# Patient Record
Sex: MALE | Race: BLACK OR AFRICAN AMERICAN | URBAN - METROPOLITAN AREA
[De-identification: names, ages, dates, MRNs, and addresses within clinical notes are randomized per-mention and may not be internally consistent; named-entity substitution may affect disease eponyms.]

---

## 2017-09-10 ENCOUNTER — APPOINTMENT (OUTPATIENT)
Dept: CT IMAGING | Facility: CLINIC | Age: 52
End: 2017-09-10
Attending: EMERGENCY MEDICINE
Payer: COMMERCIAL

## 2017-09-10 ENCOUNTER — HOSPITAL ENCOUNTER (EMERGENCY)
Facility: CLINIC | Age: 52
Discharge: HOME | End: 2017-09-10
Attending: EMERGENCY MEDICINE | Admitting: EMERGENCY MEDICINE
Payer: COMMERCIAL

## 2017-09-10 VITALS
SYSTOLIC BLOOD PRESSURE: 128 MMHG | TEMPERATURE: 97.7 F | BODY MASS INDEX: 28.77 KG/M2 | HEART RATE: 64 BPM | WEIGHT: 201 LBS | HEIGHT: 70 IN | OXYGEN SATURATION: 100 % | DIASTOLIC BLOOD PRESSURE: 85 MMHG | RESPIRATION RATE: 20 BRPM

## 2017-09-10 DIAGNOSIS — R31.0 GROSS HEMATURIA: ICD-10-CM

## 2017-09-10 LAB
ALBUMIN UR-MCNC: 30 MG/DL
APPEARANCE UR: ABNORMAL
BASOPHILS # BLD AUTO: 0 10E9/L (ref 0–0.2)
BASOPHILS NFR BLD AUTO: 0.4 %
BILIRUB UR QL STRIP: NEGATIVE
COLOR UR AUTO: ABNORMAL
DIFFERENTIAL METHOD BLD: NORMAL
EOSINOPHIL # BLD AUTO: 0.1 10E9/L (ref 0–0.7)
EOSINOPHIL NFR BLD AUTO: 1.5 %
ERYTHROCYTE [DISTWIDTH] IN BLOOD BY AUTOMATED COUNT: 13.8 % (ref 10–15)
GLUCOSE UR STRIP-MCNC: NEGATIVE MG/DL
HCT VFR BLD AUTO: 43.7 % (ref 40–53)
HGB BLD-MCNC: 14.8 G/DL (ref 13.3–17.7)
HGB UR QL STRIP: ABNORMAL
IMM GRANULOCYTES # BLD: 0 10E9/L (ref 0–0.4)
IMM GRANULOCYTES NFR BLD: 0.3 %
KETONES UR STRIP-MCNC: 5 MG/DL
LEUKOCYTE ESTERASE UR QL STRIP: NEGATIVE
LYMPHOCYTES # BLD AUTO: 1.9 10E9/L (ref 0.8–5.3)
LYMPHOCYTES NFR BLD AUTO: 28.2 %
MCH RBC QN AUTO: 32.6 PG (ref 26.5–33)
MCHC RBC AUTO-ENTMCNC: 33.9 G/DL (ref 31.5–36.5)
MCV RBC AUTO: 96 FL (ref 78–100)
MONOCYTES # BLD AUTO: 0.6 10E9/L (ref 0–1.3)
MONOCYTES NFR BLD AUTO: 9 %
MUCOUS THREADS #/AREA URNS LPF: PRESENT /LPF
NEUTROPHILS # BLD AUTO: 4.1 10E9/L (ref 1.6–8.3)
NEUTROPHILS NFR BLD AUTO: 60.6 %
NITRATE UR QL: NEGATIVE
NRBC # BLD AUTO: 0 10*3/UL
NRBC BLD AUTO-RTO: 0 /100
PH UR STRIP: 5.5 PH (ref 5–7)
PLATELET # BLD AUTO: 208 10E9/L (ref 150–450)
RBC # BLD AUTO: 4.54 10E12/L (ref 4.4–5.9)
RBC #/AREA URNS AUTO: >182 /HPF (ref 0–2)
SOURCE: ABNORMAL
SP GR UR STRIP: 1.02 (ref 1–1.03)
UROBILINOGEN UR STRIP-MCNC: NORMAL MG/DL (ref 0–2)
WBC # BLD AUTO: 6.8 10E9/L (ref 4–11)
WBC #/AREA URNS AUTO: 7 /HPF (ref 0–2)

## 2017-09-10 PROCEDURE — 74178 CT ABD&PLV WO CNTR FLWD CNTR: CPT

## 2017-09-10 PROCEDURE — 25000128 H RX IP 250 OP 636: Performed by: EMERGENCY MEDICINE

## 2017-09-10 PROCEDURE — 85025 COMPLETE CBC W/AUTO DIFF WBC: CPT | Performed by: EMERGENCY MEDICINE

## 2017-09-10 PROCEDURE — 99285 EMERGENCY DEPT VISIT HI MDM: CPT | Mod: 25

## 2017-09-10 PROCEDURE — 25000125 ZZHC RX 250: Performed by: EMERGENCY MEDICINE

## 2017-09-10 PROCEDURE — 81001 URINALYSIS AUTO W/SCOPE: CPT | Performed by: EMERGENCY MEDICINE

## 2017-09-10 RX ORDER — IOPAMIDOL 755 MG/ML
100 INJECTION, SOLUTION INTRAVASCULAR ONCE
Status: COMPLETED | OUTPATIENT
Start: 2017-09-10 | End: 2017-09-10

## 2017-09-10 RX ADMIN — IOPAMIDOL 100 ML: 755 INJECTION, SOLUTION INTRAVENOUS at 10:02

## 2017-09-10 RX ADMIN — SODIUM CHLORIDE 60 ML: 9 INJECTION, SOLUTION INTRAVENOUS at 10:02

## 2017-09-10 ASSESSMENT — ENCOUNTER SYMPTOMS
UNEXPECTED WEIGHT CHANGE: 0
CHILLS: 0
DIFFICULTY URINATING: 0
HEMATURIA: 1
MYALGIAS: 0
DIARRHEA: 0
FREQUENCY: 0
DYSURIA: 0
NAUSEA: 0
VOMITING: 0
ABDOMINAL PAIN: 0
FLANK PAIN: 0
FEVER: 0
HEADACHES: 0
SORE THROAT: 0
COUGH: 0
SHORTNESS OF BREATH: 0

## 2017-09-10 NOTE — ED AVS SNAPSHOT
Emergency Department    [address] [place] [address]    Phone:  [phone]    Fax:  [phone]                                       Nils Tellez   MRN: [id number]    Department:   Emergency Department   Date of Visit:  9/10/2017           Patient Information     Date Of Birth          1965        Your diagnoses for this visit were:     Gross hematuria        You were seen by Chandu Winters MD.      Follow-up Information     Follow up with Kobe Iniguez MD In 2 days.    Specialty:  Urology    Contact information:    [facility]  [address] [facility] [address]  [place] [address]  [phone]          Discharge Instructions         What is Hematuria?  Blood in your urine is a condition known as hematuria. Most of the time, the cause of hematuria is not serious. But, never ignore blood in the urine. Your doctor can evaluate you to find the cause of the bleeding and treat it, if needed.  Types of hematuria    Gross hematuria means that the blood can be seen by the naked eye. The urine may look pinkish, brownish, or bright red.    Microscopic hematuria means that the urine is clear, but blood cells can be seen when urine is looked at under a microscope or tested in a lab.  Both types of hematuria can have the same causes. Neither one is necessarily more serious than the other. With either type, you may have other symptoms, such as pain, pressure, or burning when you urinate, abdominal pain, or back pain. Or, you may not have any other symptoms. No matter how much blood is found, the cause of the bleeding needs to be identified.  Finding the cause of hematuria  To evaluate your condition, your doctor will first confirm that blood is indeed present. Then other tests are done to pinpoint where the blood is coming from and why. Your doctor will decide which tests will best determine the cause of your hematuria. Some common tests are listed below.    History and  physical exam    Lab tests may include urinalysis, a urine culture, a urine cytology, and various blood tests    Cystoscopy    Computed tomography (CT) or CT urography    Magnetic resonance imaging (MRI) or MR urography    Ultrasound of the kidney    Kidney biopsy  Causes of hematuria include the very benign (exercised induced hematuria) to the very severe (cancer of the urinary system). A variety of treatments are available depending on the cause.  Date Last Reviewed: 12/2/2016 2000-2017 [employer]. [address] [place] [address]. All rights reserved. This information is not intended as a substitute for professional medical care. Always follow your healthcare professional's instructions.          24 Hour Appointment Hotline       To make an appointment at any [facility], call [phone] ([phone]). If you don't have a family doctor or clinic, we will help you find one. [facility] are conveniently located to serve the needs of you and your family.             Review of your medicines      Our records show that you are taking the medicines listed below. If these are incorrect, please call your family doctor or clinic.        Dose / Directions Last dose taken    ALTACE 5 MG capsule   Quantity:  30   Generic drug:  ramipril        1 TABLET DAILY   Refills:  0        Garlic Oil 1000 MG Caps        Refills:  0        NIASPAN 1000 MG CR tablet   Quantity:  30   Generic drug:  niacin        1 TABLET AT BEDTIME   Refills:  0        ZETIA 10 MG tablet   Quantity:  30   Generic drug:  ezetimibe        1 TABLET DAILY   Refills:  0                Procedures and tests performed during your visit     CBC with platelets + differential    CT Abdomen Pelvis Hematuria w/o & w Contrast    UA reflex to Microscopic      Orders Needing Specimen Collection     None      Pending Results     Date and Time Order Name Status Description    9/10/2017 0949 CT Abdomen Pelvis Hematuria w/o & w  Contrast Preliminary             Pending Culture Results     No orders found from 9/8/2017 to 9/11/2017.            Pending Results Instructions     If you had any lab results that were not finalized at the time of your Discharge, you can call the ED Lab Result RN at [phone]. You will be contacted by this team for any positive Lab results or changes in treatment. The nurses are available 7 days a week from 10A to 6:30P.  You can leave a message 24 hours per day and they will return your call.        Test Results From Your Hospital Stay        9/10/2017  9:59 AM      Component Results     Component Value Ref Range & Units Status    WBC 6.8 4.0 - 11.0 10e9/L Final    RBC Count 4.54 4.4 - 5.9 10e12/L Final    Hemoglobin 14.8 13.3 - 17.7 g/dL Final    Hematocrit 43.7 40.0 - 53.0 % Final    MCV 96 78 - 100 fl Final    MCH 32.6 26.5 - 33.0 pg Final    MCHC 33.9 31.5 - 36.5 g/dL Final    RDW 13.8 10.0 - 15.0 % Final    Platelet Count 208 150 - 450 10e9/L Final    Diff Method Automated Method  Final    % Neutrophils 60.6 % Final    % Lymphocytes 28.2 % Final    % Monocytes 9.0 % Final    % Eosinophils 1.5 % Final    % Basophils 0.4 % Final    % Immature Granulocytes 0.3 % Final    Nucleated RBCs 0 0 /100 Final    Absolute Neutrophil 4.1 1.6 - 8.3 10e9/L Final    Absolute Lymphocytes 1.9 0.8 - 5.3 10e9/L Final    Absolute Monocytes 0.6 0.0 - 1.3 10e9/L Final    Absolute Eosinophils 0.1 0.0 - 0.7 10e9/L Final    Absolute Basophils 0.0 0.0 - 0.2 10e9/L Final    Abs Immature Granulocytes 0.0 0 - 0.4 10e9/L Final    Absolute Nucleated RBC 0.0  Final         9/10/2017 10:34 AM      Narrative     CT ABDOMEN/PELVIS HEMATURIA WITH AND WITHOUT IV CONTRAST September 10,  2017 10:15 AM     HISTORY: Hematuria.    TECHNIQUE: CT urogram protocol was performed. Volumetric helical  sections were acquired from the lung bases through the ischial  tuberosities prior to administration of IV contrast. 100 mL Isovue-370  IV were administered  using a split bolus technique. After contrast  administration, volumetric helical sections were again acquired from  the lung bases to the ischial tuberosities.  Coronal images were also  reconstructed. Radiation dose for this scan was reduced using  automated exposure control, adjustment of the mA and/or kV according  to patient size, or iterative reconstruction technique.    COMPARISON: None.    FINDINGS:      Right urinary tract: There is duplication of the right urinary  collecting system and at least partial duplication of the right  ureter. 0.7 cm nonobstructing stone appears to be in the right renal  pelvis lower pole moiety. No other urinary calculi are identified on  the right. No hydronephrosis. No solid renal masses. There are two  small right renal cysts, with the largest measuring 1.3 cm. The  collecting system and proximal ureter are moderately well opacified,  and no filling defects are identified.    Left urinary tract: No urinary calculi. No hydronephrosis. No solid  renal masses. Small cyst in the lower pole of the left kidney measures  0.8 cm. The collecting system and proximal ureter are moderately well  opacified, and no filling defects are identified.      Urinary bladder: No bladder stones or masses are identified. x    Other findings:  The lung bases are clear. The liver, gallbladder,  spleen, adrenal glands, and pancreas are unremarkable. No bowel  obstruction. No convincing evidence for colitis or diverticulitis. The  appendix is not seen, consistent with history of prior appendectomy.  No free fluid in the pelvis. Mild atherosclerotic aortoiliac  calcification.        Impression     IMPRESSION:   1. 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  2. Nonobstructing 0.7 cm stone in the right renal pelvis lower pole  moiety.  3. No urinary tract masses or evidence for urinary obstruction.         9/10/2017 10:08 AM      Component  Results     Component Value Ref Range & Units Status    Color Urine Light Red  Final    Appearance Urine Slightly Cloudy  Final    Glucose Urine Negative NEG^Negative mg/dL Final    Bilirubin Urine Negative NEG^Negative Final    Ketones Urine 5 (A) NEG^Negative mg/dL Final    Specific Gravity Urine 1.018 1.003 - 1.035 Final    Blood Urine Large (A) NEG^Negative Final    pH Urine 5.5 5.0 - 7.0 pH Final    Protein Albumin Urine 30 (A) NEG^Negative mg/dL Final    Urobilinogen mg/dL Normal 0.0 - 2.0 mg/dL Final    Nitrite Urine Negative NEG^Negative Final    Leukocyte Esterase Urine Negative NEG^Negative Final    Source Midstream Urine  Final    RBC Urine >182 (H) 0 - 2 /HPF Final    WBC Urine 7 (H) 0 - 2 /HPF Final    Mucous Urine Present (A) NEG^Negative /LPF Final                Clinical Quality Measure: Blood Pressure Screening     Your blood pressure was checked while you were in the emergency department today. The last reading we obtained was  BP: (!) 164/95 . Please read the guidelines below about what these numbers mean and what you should do about them.  If your systolic blood pressure (the top number) is less than 120 and your diastolic blood pressure (the bottom number) is less than 80, then your blood pressure is normal. There is nothing more that you need to do about it.  If your systolic blood pressure (the top number) is 120-139 or your diastolic blood pressure (the bottom number) is 80-89, your blood pressure may be higher than it should be. You should have your blood pressure rechecked within a year by a primary care provider.  If your systolic blood pressure (the top number) is 140 or greater or your diastolic blood pressure (the bottom number) is 90 or greater, you may have high blood pressure. High blood pressure is treatable, but if left untreated over time it can put you at risk for heart attack, stroke, or kidney failure. You should have your blood pressure rechecked by a primary care provider  "within the next 4 weeks.  If your provider in the emergency department today gave you specific instructions to follow-up with your doctor or provider even sooner than that, you should follow that instruction and not wait for up to 4 weeks for your follow-up visit.        Thank you for choosing [facility]       Thank you for choosing [facility] for your care. Our goal is always to provide you with excellent care. Hearing back from our patients is one way we can continue to improve our services. Please take a few minutes to complete the written survey that you may receive in the mail after you visit with us. Thank you!        [employer] Information     [employer] lets you send messages to your doctor, view your test results, renew your prescriptions, schedule appointments and more. To sign up, go to [email or web address] . Click on \"Log in\" on the left side of the screen, which will take you to the Welcome page. Then click on \"Sign up Now\" on the right side of the page.     You will be asked to enter the access code listed below, as well as some personal information. Please follow the directions to create your username and password.     Your access code is: [email or web address]  Expires: 2017 11:05 AM     Your access code will  in 90 days. If you need help or a new code, please call your [facility] clinic or [phone].        Care EveryWhere ID     This is your Care EveryWhere ID. This could be used by other organizations to access your [facility] medical records  [id number]        Equal Access to Services     RADHA HATCH AH: Hadii anastasia almanzao Soconnie, waaxda luqadaha, qaybta kaalmada satinder, sharon shelby . So [facility] [phone].    ATENCIÓN: Si habla español, tiene a hamilton disposición servicios gratuitos de asistencia lingüística. Llame al [phone].    We comply with applicable federal civil rights laws and [place] laws. We do not discriminate on the basis of race, color, national " origin, age, disability sex, sexual orientation or gender identity.            After Visit Summary       This is your record. Keep this with you and show to your community pharmacist(s) and doctor(s) at your next visit.

## 2017-09-10 NOTE — ED AVS SNAPSHOT
Emergency Department    [address] [place] [address]    Phone:  [phone]    Fax:  [phone]                                       Nils Tellez   MRN: [id number]    Department:   Emergency Department   Date of Visit:  9/10/2017           After Visit Summary Signature Page     I have received my discharge instructions, and my questions have been answered. I have discussed any challenges I see with this plan with the nurse or doctor.    ..........................................................................................................................................  Patient/Patient Representative Signature      ..........................................................................................................................................  Patient Representative Print Name and Relationship to Patient    ..................................................               ................................................  Date                                            Time    ..........................................................................................................................................  Reviewed by Signature/Title    ...................................................              ..............................................  Date                                                            Time

## 2017-09-10 NOTE — ED PROVIDER NOTES
History     Chief Complaint:  Hematuria     HPI   Nils Tellez is a 51 year old male with a history of hypertension and hyperlipidemia who presents with hematuria. The patient reports he has been having darker colored urine for the past 2 weeks. He was seen at [facility] 2 weeks ago where he had blood work and urine (results below) and was arranged for follow up with urology. The patient has an appointment with Dr. Iniguez of urology on Tuesday. He reports his urine has been getting darker over the past few days and he was nervous, prompting him to come to the ED for evaluation. The patient denies any abdominal pain or discomfort. He denies any flank pain, testicular pain or soreness, fever, chills, urinary frequency, or pain or burning with urination. No unintentional weight loss, night sweats, chest pain, cough, or sore throat. The patient has a history of hernia repair and appendicitis but no other abdominal surgeries. No significant Aspirin or Advil use. No history of prostate problems.     Laboratory evaluation from 8/26 at [facility]:  UA: [place], cloudy, Protein 30, Blood large, RBC >100, WBC 3-5  BMP: Creatinine 0.91  CBC: WBC 6.7, HGB 14.3,   INR: 1.0    Allergies:  No Known Allergies     Medications:    ALTACE 5 MG OR CAPS  NIASPAN 1000 MG OR TBCR  ZETIA 10 MG OR TABS    Past Medical History:    Hypertension  Hyperlipidemia     Past Surgical History:    Appendectomy  Hernia repair     Family History:    History reviewed. No pertinent family history.     Social History:  Smoking status: No  Alcohol use: Yes, occasional   Presents to the ED with his wife   Marital Status:   [2]     Review of Systems   Constitutional: Negative for chills, fever and unexpected weight change.   HENT: Negative for sore throat.    Respiratory: Negative for cough and shortness of breath.    Cardiovascular: Negative for chest pain.   Gastrointestinal: Negative for abdominal pain, diarrhea, nausea and vomiting.  "  Genitourinary: Positive for hematuria. Negative for difficulty urinating, dysuria, flank pain, frequency, penile pain and testicular pain.   Musculoskeletal: Negative for myalgias.   Skin: Negative for rash.   Neurological: Negative for headaches.   All other systems reviewed and are negative.      Physical Exam   Patient Vitals for the past 24 hrs:   BP Temp Temp src Pulse Resp SpO2 Height Weight   09/10/17 1110 128/85 - - 64 20 100 % - -   09/10/17 0933 (!) 164/95 97.7  F (36.5  C) Oral 58 20 100 % 1.778 m (5' 10\") 91.2 kg (201 lb)       Physical Exam  Constitutional: Black male sitting.   HENT: No signs of trauma.   Eyes: EOM are normal. Pupils are equal, round, and reactive to light.   Neck: Normal range of motion. No JVD present. No cervical adenopathy.  Cardiovascular: Regular rhythm.  Exam reveals no gallop and no friction rub.    No murmur heard.  Pulmonary/Chest: Bilateral breath sounds normal. No wheezes, rhonchi or rales.  Abdominal: Soft. No tenderness. No rebound or guarding. No CVA tenderness. 2+ femoral pulses.   Genitourinary: Circumcised penis. Bilateral descended testes, nontender.    Musculoskeletal: No edema. No tenderness.   Lymphadenopathy: No lymphadenopathy.   Neurological: Alert and oriented to person, place, and time. Normal strength. Coordination normal.   Skin: Skin is warm and dry. No rash noted. No erythema.     Emergency Department Course   Imaging:  Radiographic findings were communicated with the patient who voiced understanding of the findings.    CT-scan Abdomen/Pelvis w/o and w contrast:  1. 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  2. Nonobstructing 0.7 cm stone in the right renal pelvis lower pole  moiety.  3. No urinary tract masses or evidence for urinary obstruction.  Preliminary result per radiology.     Laboratory:  CBC: WBC 6.8, HGB 14.8,   UA: Ketones 5, Blood large, Protein albumin 30, RBC >182, " RBC 7, Mucous present, o/w negative    Emergency Department Course:  Past medical records, nursing notes, and vitals reviewed.  0940: I performed an exam of the patient and obtained history, as documented above.  IV inserted and blood drawn. Urine sent, results above.   The patient was sent for a CT abdomen pelvis while in the emergency department, findings above.    1102: I rechecked the patient. Explained findings to the patient.    I rechecked the patient.  Findings and plan explained to the Patient. Patient discharged home with instructions regarding supportive care, medications, and reasons to return. The importance of close follow-up was reviewed.     Impression & Plan      Medical Decision Making:  Mr. Tellez is a 51 year old male who for the past 2 weeks has had some intermittent gross hematuria. He was initially seen at [facility] where urine did show blood. His CBC, BMP, and INR were unremarkable. He was referred to urology and has an appointment with Dr. Iniguez in 2 days. However, the patient states he has more blood in his urine and it has gotten darker. He was nervous and worried and came here. He denies any abdominal pain, fevers, or chills. He has had weight loss this past year but this has been intentional. He has no history of kidney problems or bleeding disorders. No x-ray was done at [facility] so one was done here for hematuria. There was a 7mm non-obstructing stone in the right kidney and there was duplicated portion of the right ureter, no masses seen. Urine continues to show red cells and a few white cells. I do not think this represents infection. The patient will keep his appointment with Dr. Iniguez in 2 days. If he has increasing weakness or pain, recheck in the ED.     Impression:    ICD-10-CM   1. Gross hematuria R31.0   2.  Non-obstructing kidney stone      Plan: As noted    Lynn Birmingham  9/10/2017    EMERGENCY DEPARTMENT    I, Lynn Birmingham, am serving as a scribe at 9:40 AM on  9/10/2017 to document services personally performed by Chandu Winters MD based on my observations and the provider's statements to me.        Chandu Winters MD  09/10/17 0848

## 2017-09-10 NOTE — DISCHARGE INSTRUCTIONS
What is Hematuria?  Blood in your urine is a condition known as hematuria. Most of the time, the cause of hematuria is not serious. But, never ignore blood in the urine. Your doctor can evaluate you to find the cause of the bleeding and treat it, if needed.  Types of hematuria    Gross hematuria means that the blood can be seen by the naked eye. The urine may look pinkish, brownish, or bright red.    Microscopic hematuria means that the urine is clear, but blood cells can be seen when urine is looked at under a microscope or tested in a lab.  Both types of hematuria can have the same causes. Neither one is necessarily more serious than the other. With either type, you may have other symptoms, such as pain, pressure, or burning when you urinate, abdominal pain, or back pain. Or, you may not have any other symptoms. No matter how much blood is found, the cause of the bleeding needs to be identified.  Finding the cause of hematuria  To evaluate your condition, your doctor will first confirm that blood is indeed present. Then other tests are done to pinpoint where the blood is coming from and why. Your doctor will decide which tests will best determine the cause of your hematuria. Some common tests are listed below.    History and physical exam    Lab tests may include urinalysis, a urine culture, a urine cytology, and various blood tests    Cystoscopy    Computed tomography (CT) or CT urography    Magnetic resonance imaging (MRI) or MR urography    Ultrasound of the kidney    Kidney biopsy  Causes of hematuria include the very benign (exercised induced hematuria) to the very severe (cancer of the urinary system). A variety of treatments are available depending on the cause.  Date Last Reviewed: 12/2/2016 2000-2017 [employer]. [address] [place] [address], [place] [address]. All rights reserved. This information is not intended as a substitute for professional medical care. Always follow your healthcare  professional's instructions.

## 2017-10-16 RX ORDER — TRIAMCINOLONE ACETONIDE 1 MG/G
CREAM TOPICAL 2 TIMES DAILY
COMMUNITY

## 2017-10-16 RX ORDER — FLUTICASONE PROPIONATE 50 MCG
1-2 SPRAY, SUSPENSION (ML) NASAL DAILY
COMMUNITY

## 2017-10-16 RX ORDER — ALPRAZOLAM 0.5 MG
0.5 TABLET ORAL 2 TIMES DAILY PRN
COMMUNITY

## 2017-10-16 RX ORDER — MULTIPLE VITAMINS W/ MINERALS TAB 9MG-400MCG
1 TAB ORAL DAILY
COMMUNITY

## 2017-10-17 ENCOUNTER — ANESTHESIA (OUTPATIENT)
Dept: SURGERY | Facility: CLINIC | Age: 52
End: 2017-10-17
Payer: COMMERCIAL

## 2017-10-17 ENCOUNTER — HOSPITAL ENCOUNTER (OUTPATIENT)
Facility: CLINIC | Age: 52
Discharge: HOME | End: 2017-10-17
Attending: UROLOGY | Admitting: UROLOGY
Payer: COMMERCIAL

## 2017-10-17 ENCOUNTER — SURGERY (OUTPATIENT)
Age: 52
End: 2017-10-17

## 2017-10-17 ENCOUNTER — APPOINTMENT (OUTPATIENT)
Dept: GENERAL RADIOLOGY | Facility: CLINIC | Age: 52
End: 2017-10-17
Attending: UROLOGY
Payer: COMMERCIAL

## 2017-10-17 ENCOUNTER — ANESTHESIA EVENT (OUTPATIENT)
Dept: SURGERY | Facility: CLINIC | Age: 52
End: 2017-10-17
Payer: COMMERCIAL

## 2017-10-17 VITALS
SYSTOLIC BLOOD PRESSURE: 132 MMHG | WEIGHT: 203.8 LBS | HEIGHT: 70 IN | RESPIRATION RATE: 16 BRPM | DIASTOLIC BLOOD PRESSURE: 76 MMHG | OXYGEN SATURATION: 99 % | TEMPERATURE: 95.5 F | BODY MASS INDEX: 29.18 KG/M2

## 2017-10-17 DIAGNOSIS — N20.1 RIGHT URETERAL STONE: ICD-10-CM

## 2017-10-17 DIAGNOSIS — G89.18 POSTOPERATIVE PAIN: Primary | ICD-10-CM

## 2017-10-17 PROCEDURE — 27210995 ZZH RX 272: Performed by: UROLOGY

## 2017-10-17 PROCEDURE — 25000125 ZZHC RX 250: Performed by: NURSE ANESTHETIST, CERTIFIED REGISTERED

## 2017-10-17 PROCEDURE — 37000009 ZZH ANESTHESIA TECHNICAL FEE, EACH ADDTL 15 MIN: Performed by: UROLOGY

## 2017-10-17 PROCEDURE — 25000566 ZZH SEVOFLURANE, EA 15 MIN: Performed by: UROLOGY

## 2017-10-17 PROCEDURE — 71000012 ZZH RECOVERY PHASE 1 LEVEL 1 FIRST HR: Performed by: UROLOGY

## 2017-10-17 PROCEDURE — 25800025 ZZH RX 258: Performed by: UROLOGY

## 2017-10-17 PROCEDURE — 36000058 ZZH SURGERY LEVEL 3 EA 15 ADDTL MIN: Performed by: UROLOGY

## 2017-10-17 PROCEDURE — 71000027 ZZH RECOVERY PHASE 2 EACH 15 MINS: Performed by: UROLOGY

## 2017-10-17 PROCEDURE — 71000013 ZZH RECOVERY PHASE 1 LEVEL 1 EA ADDTL HR: Performed by: UROLOGY

## 2017-10-17 PROCEDURE — 37000008 ZZH ANESTHESIA TECHNICAL FEE, 1ST 30 MIN: Performed by: UROLOGY

## 2017-10-17 PROCEDURE — 25000125 ZZHC RX 250: Performed by: UROLOGY

## 2017-10-17 PROCEDURE — 25000128 H RX IP 250 OP 636: Performed by: ANESTHESIOLOGY

## 2017-10-17 PROCEDURE — 27210794 ZZH OR GENERAL SUPPLY STERILE: Performed by: UROLOGY

## 2017-10-17 PROCEDURE — 40000170 ZZH STATISTIC PRE-PROCEDURE ASSESSMENT II: Performed by: UROLOGY

## 2017-10-17 PROCEDURE — C1758 CATHETER, URETERAL: HCPCS | Performed by: UROLOGY

## 2017-10-17 PROCEDURE — C2617 STENT, NON-COR, TEM W/O DEL: HCPCS | Performed by: UROLOGY

## 2017-10-17 PROCEDURE — C1769 GUIDE WIRE: HCPCS | Performed by: UROLOGY

## 2017-10-17 PROCEDURE — 25000128 H RX IP 250 OP 636: Performed by: UROLOGY

## 2017-10-17 PROCEDURE — 25000128 H RX IP 250 OP 636: Performed by: NURSE ANESTHETIST, CERTIFIED REGISTERED

## 2017-10-17 PROCEDURE — 36000056 ZZH SURGERY LEVEL 3 1ST 30 MIN: Performed by: UROLOGY

## 2017-10-17 PROCEDURE — 40000277 XR SURGERY CARM FLUORO LESS THAN 5 MIN W STILLS

## 2017-10-17 DEVICE — STENT URETERAL INLAY OPTIMA 4.7FRX24CM 788424: Type: IMPLANTABLE DEVICE | Site: URETER | Status: FUNCTIONAL

## 2017-10-17 RX ORDER — OXYBUTYNIN CHLORIDE 5 MG/1
TABLET ORAL
Qty: 30 TABLET | Refills: 0 | Status: SHIPPED | OUTPATIENT
Start: 2017-10-17

## 2017-10-17 RX ORDER — ONDANSETRON 4 MG/1
4 TABLET, ORALLY DISINTEGRATING ORAL EVERY 30 MIN PRN
Status: DISCONTINUED | OUTPATIENT
Start: 2017-10-17 | End: 2017-10-17 | Stop reason: HOSPADM

## 2017-10-17 RX ORDER — SODIUM CHLORIDE, SODIUM LACTATE, POTASSIUM CHLORIDE, CALCIUM CHLORIDE 600; 310; 30; 20 MG/100ML; MG/100ML; MG/100ML; MG/100ML
INJECTION, SOLUTION INTRAVENOUS CONTINUOUS
Status: DISCONTINUED | OUTPATIENT
Start: 2017-10-17 | End: 2017-10-17 | Stop reason: HOSPADM

## 2017-10-17 RX ORDER — FENTANYL CITRATE 50 UG/ML
25-50 INJECTION, SOLUTION INTRAMUSCULAR; INTRAVENOUS
Status: DISCONTINUED | OUTPATIENT
Start: 2017-10-17 | End: 2017-10-17 | Stop reason: HOSPADM

## 2017-10-17 RX ORDER — ONDANSETRON 2 MG/ML
4 INJECTION INTRAMUSCULAR; INTRAVENOUS EVERY 30 MIN PRN
Status: DISCONTINUED | OUTPATIENT
Start: 2017-10-17 | End: 2017-10-17 | Stop reason: HOSPADM

## 2017-10-17 RX ORDER — ACETAMINOPHEN 325 MG/1
650 TABLET ORAL
Status: CANCELLED | OUTPATIENT
Start: 2017-10-17

## 2017-10-17 RX ORDER — NALOXONE HYDROCHLORIDE 0.4 MG/ML
.1-.4 INJECTION, SOLUTION INTRAMUSCULAR; INTRAVENOUS; SUBCUTANEOUS
Status: DISCONTINUED | OUTPATIENT
Start: 2017-10-17 | End: 2017-10-17 | Stop reason: HOSPADM

## 2017-10-17 RX ORDER — MEPERIDINE HYDROCHLORIDE 25 MG/ML
12.5 INJECTION INTRAMUSCULAR; INTRAVENOUS; SUBCUTANEOUS
Status: DISCONTINUED | OUTPATIENT
Start: 2017-10-17 | End: 2017-10-17 | Stop reason: HOSPADM

## 2017-10-17 RX ORDER — HYDROCODONE BITARTRATE AND ACETAMINOPHEN 5; 325 MG/1; MG/1
1-2 TABLET ORAL EVERY 6 HOURS PRN
Qty: 20 TABLET | Refills: 0 | Status: SHIPPED | OUTPATIENT
Start: 2017-10-17

## 2017-10-17 RX ORDER — HYDROCODONE BITARTRATE AND ACETAMINOPHEN 5; 325 MG/1; MG/1
1-2 TABLET ORAL
Status: CANCELLED | OUTPATIENT
Start: 2017-10-17

## 2017-10-17 RX ORDER — ONDANSETRON 2 MG/ML
INJECTION INTRAMUSCULAR; INTRAVENOUS PRN
Status: DISCONTINUED | OUTPATIENT
Start: 2017-10-17 | End: 2017-10-17

## 2017-10-17 RX ORDER — LIDOCAINE HYDROCHLORIDE 20 MG/ML
INJECTION, SOLUTION INFILTRATION; PERINEURAL PRN
Status: DISCONTINUED | OUTPATIENT
Start: 2017-10-17 | End: 2017-10-17

## 2017-10-17 RX ORDER — FENTANYL CITRATE 50 UG/ML
INJECTION, SOLUTION INTRAMUSCULAR; INTRAVENOUS PRN
Status: DISCONTINUED | OUTPATIENT
Start: 2017-10-17 | End: 2017-10-17

## 2017-10-17 RX ORDER — HYDROMORPHONE HYDROCHLORIDE 1 MG/ML
.3-.5 INJECTION, SOLUTION INTRAMUSCULAR; INTRAVENOUS; SUBCUTANEOUS EVERY 10 MIN PRN
Status: DISCONTINUED | OUTPATIENT
Start: 2017-10-17 | End: 2017-10-17 | Stop reason: HOSPADM

## 2017-10-17 RX ORDER — TAMSULOSIN HYDROCHLORIDE 0.4 MG/1
CAPSULE ORAL
Qty: 14 CAPSULE | Refills: 0 | Status: SHIPPED | OUTPATIENT
Start: 2017-10-17

## 2017-10-17 RX ORDER — DEXAMETHASONE SODIUM PHOSPHATE 4 MG/ML
INJECTION, SOLUTION INTRA-ARTICULAR; INTRALESIONAL; INTRAMUSCULAR; INTRAVENOUS; SOFT TISSUE PRN
Status: DISCONTINUED | OUTPATIENT
Start: 2017-10-17 | End: 2017-10-17

## 2017-10-17 RX ORDER — LEVOFLOXACIN 5 MG/ML
500 INJECTION, SOLUTION INTRAVENOUS
Status: COMPLETED | OUTPATIENT
Start: 2017-10-17 | End: 2017-10-17

## 2017-10-17 RX ORDER — PROPOFOL 10 MG/ML
INJECTION, EMULSION INTRAVENOUS PRN
Status: DISCONTINUED | OUTPATIENT
Start: 2017-10-17 | End: 2017-10-17

## 2017-10-17 RX ORDER — EPHEDRINE SULFATE 50 MG/ML
INJECTION, SOLUTION INTRAMUSCULAR; INTRAVENOUS; SUBCUTANEOUS PRN
Status: DISCONTINUED | OUTPATIENT
Start: 2017-10-17 | End: 2017-10-17

## 2017-10-17 RX ADMIN — PHENYLEPHRINE HYDROCHLORIDE 100 MCG: 10 INJECTION, SOLUTION INTRAMUSCULAR; INTRAVENOUS; SUBCUTANEOUS at 07:49

## 2017-10-17 RX ADMIN — FENTANYL CITRATE 25 MCG: 50 INJECTION, SOLUTION INTRAMUSCULAR; INTRAVENOUS at 08:25

## 2017-10-17 RX ADMIN — ONDANSETRON 4 MG: 2 INJECTION INTRAMUSCULAR; INTRAVENOUS at 07:50

## 2017-10-17 RX ADMIN — FENTANYL CITRATE 50 MCG: 50 INJECTION, SOLUTION INTRAMUSCULAR; INTRAVENOUS at 07:34

## 2017-10-17 RX ADMIN — Medication 5 MG: at 08:13

## 2017-10-17 RX ADMIN — WATER 1000 ML: 100 IRRIGANT IRRIGATION at 07:30

## 2017-10-17 RX ADMIN — LIDOCAINE HYDROCHLORIDE 100 MG: 20 INJECTION, SOLUTION INFILTRATION; PERINEURAL at 07:34

## 2017-10-17 RX ADMIN — PHENYLEPHRINE HYDROCHLORIDE 100 MCG: 10 INJECTION, SOLUTION INTRAMUSCULAR; INTRAVENOUS; SUBCUTANEOUS at 07:42

## 2017-10-17 RX ADMIN — SODIUM CHLORIDE, POTASSIUM CHLORIDE, SODIUM LACTATE AND CALCIUM CHLORIDE: 600; 310; 30; 20 INJECTION, SOLUTION INTRAVENOUS at 07:29

## 2017-10-17 RX ADMIN — PROPOFOL 200 MG: 10 INJECTION, EMULSION INTRAVENOUS at 07:34

## 2017-10-17 RX ADMIN — MIDAZOLAM HYDROCHLORIDE 2 MG: 1 INJECTION, SOLUTION INTRAMUSCULAR; INTRAVENOUS at 07:29

## 2017-10-17 RX ADMIN — FENTANYL CITRATE 25 MCG: 50 INJECTION, SOLUTION INTRAMUSCULAR; INTRAVENOUS at 08:06

## 2017-10-17 RX ADMIN — LIDOCAINE HYDROCHLORIDE 10 ML: 20 JELLY TOPICAL at 07:54

## 2017-10-17 RX ADMIN — Medication 5 MG: at 07:52

## 2017-10-17 RX ADMIN — DEXAMETHASONE SODIUM PHOSPHATE 4 MG: 4 INJECTION, SOLUTION INTRA-ARTICULAR; INTRALESIONAL; INTRAMUSCULAR; INTRAVENOUS; SOFT TISSUE at 07:40

## 2017-10-17 RX ADMIN — WATER 3000 ML: 100 INJECTION, SOLUTION INTRAVENOUS at 07:30

## 2017-10-17 RX ADMIN — LEVOFLOXACIN 500 MG: 5 INJECTION, SOLUTION INTRAVENOUS at 07:40

## 2017-10-17 RX ADMIN — ATROPA BELLADONNA AND OPIUM 30 MG: 16.2; 3 SUPPOSITORY RECTAL at 08:22

## 2017-10-17 RX ADMIN — WATER 3 ML GIVEN: 1 IRRIGANT IRRIGATION at 08:23

## 2017-10-17 NOTE — ANESTHESIA PREPROCEDURE EVALUATION
Anesthesia Evaluation     . Pt has had prior anesthetic.     No history of anesthetic complications          ROS/MED HX    ENT/Pulmonary:      (-) sleep apnea   Neurologic:       Cardiovascular:     (+) Dyslipidemia, hypertension----. : . . . :. .       METS/Exercise Tolerance:  >4 METS   Hematologic:         Musculoskeletal:         GI/Hepatic:        (-) GERD   Renal/Genitourinary:     (+) Nephrolithiasis ,       Endo:      (-) Type I DM   Psychiatric:     (+) psychiatric history anxiety      Infectious Disease:         Malignancy:         Other:                     Physical Exam  Normal systems: dental    Airway   Mallampati: II  TM distance: >3 FB  Neck ROM: full    Dental     Cardiovascular   Rhythm and rate: regular and normal      Pulmonary    breath sounds clear to auscultation                    Anesthesia Plan      History & Physical Review  History and physical reviewed and following examination; no interval change.    ASA Status:  2 .    NPO Status:  > 8 hours    Plan for General and LMA with Intravenous induction. Maintenance will be Balanced.    PONV prophylaxis:  Ondansetron (or other 5HT-3) and Dexamethasone or Solumedrol  toradol if ok with surgeon      Postoperative Care      Consents  Anesthetic plan, risks, benefits and alternatives discussed with:  Patient..                          .

## 2017-10-17 NOTE — DISCHARGE INSTRUCTIONS
Same Day Surgery Discharge Instructions for  Sedation and General Anesthesia       It's not unusual to feel dizzy, light-headed or faint for up to 24 hours after surgery or while taking pain medication.  If you have these symptoms: sit for a few minutes before standing and have someone assist you when you get up to walk or use the bathroom.      You should rest and relax for the next 24 hours. We recommend you make arrangements to have an adult stay with you for at least 24 hours after your discharge.  Avoid hazardous and strenuous activity.      DO NOT DRIVE any vehicle or operate mechanical equipment for 24 hours following the end of your surgery.  Even though you may feel normal, your reactions may be affected by the medication you have received.      Do not drink alcoholic beverages for 24 hours following surgery.       Slowly progress to your regular diet as you feel able. It's not unusual to feel nauseated and/or vomit after receiving anesthesia.  If you develop these symptoms, drink clear liquids (apple juice, ginger ale, broth, 7-up, etc. ) until you feel better.  If your nausea and vomiting persists for 24 hours, please notify your surgeon.        All narcotic pain medications, along with inactivity and anesthesia, can cause constipation. Drinking plenty of liquids and increasing fiber intake will help.      For any questions of a medical nature, call your surgeon.      Do not make important decisions for 24 hours.      If you had general anesthesia, you may have a sore throat for a couple of days related to the breathing tube used during surgery.  You may use Cepacol lozenges to help with this discomfort.  If it worsens or if you develop a fever, contact your surgeon.       If you feel your pain is not well managed with the pain medications prescribed by your surgeon, please contact your surgeon's office to let them know so they can address your concerns.             Cystoscopy, Holmium Laser with Stent  Placement Discharge Instructions    Holmium laser lithotripsy was used to break up your kidney stone(s). It is normal to have visible blood in the urine, burning, urgency and frequency following this procedure. These symptoms may last for a few days to weeks.     Diet:    To help pass stone fragments and clear the blood out of the urine, it is important to drink 6-8 glasses of fluids per day at home - at least 3-4 glasses should be water.       Return to the diet that you were on before the procedure, unless you are given specific diet instructions.    Activity:    Walk short distances and increase as your strength allows.    You may climb stairs.    Do not do strenuous exercise or heavy lifting until approved by surgeon.    Do not drive while taking narcotic pain medications.    Bathing:    You may take a shower.          Stent information    During surgery, a stent was placed in the ureter.  The ureter is the tube that drains urine from the kidney to the bladder.  The stent is placed to dilate (open) the ureter so the stone fragments can pass easily through the ureter or to decrease ureteral swelling after surgery, or to relieve an obstruction. The stent is made of rubber. The upper end of the stent curls in the kidney while the lower end rests in the bladder.    While the stent is in place you may experience the following symptoms:    Blood and/or small blood clots in urine.    Bladder spasm (frequency and urgency of urination).    Discomfort or aching in the back or side where the stent is.    Burning or discomfort at the end of urine stream.    To decrease these symptoms you should:    Take pain medication as prescribed.    Drink plenty of fluids.    If you experience pain at the end of urination try not emptying your bladder completely.    If having discomfort in back or side, decrease activity.    Call your physician if these signs/symptoms are present:    Pain that is not relieved by a short rest or ordered  pain medications.    Temperature at or above 101.0 F or chills.    Inability or difficulty urinating.     Excessive blood in urine.    Any questions or concerns.            **If you have questions or concerns about your procedure,   call Dr. Iniguez at [phone]**

## 2017-10-17 NOTE — ANESTHESIA POSTPROCEDURE EVALUATION
Patient: Nils Tellez    Procedure(s):  CYSTOSCOPY RIGHT RETROGRADE, RIGHT URETEROSCOPY HOLMIUM LASER LITHOTRIPSY AND RIGHT STENT PLACEMENT  - Wound Class: II-Clean Contaminated    Diagnosis:RIGHT URETERAL STONE  Diagnosis Additional Information: No value filed.    Anesthesia Type:  MAC    Note:  Anesthesia Post Evaluation    Patient location during evaluation: PACU  Patient participation: Able to fully participate in evaluation  Level of consciousness: awake  Pain management: adequate  Airway patency: patent  Cardiovascular status: acceptable  Respiratory status: acceptable  Hydration status: acceptable  PONV: none     Anesthetic complications: None          Last vitals:  Vitals:    10/17/17 0915 10/17/17 0930 10/17/17 1013   BP: 115/70 118/70 132/76   Resp: 24 10 16   Temp: 34.9  C (94.8  F) 35.3  C (95.5  F)    SpO2: 100% 99% 99%         Electronically Signed By: Jacqueline Santana MD  October 17, 2017  11:57 AM

## 2017-10-17 NOTE — ANESTHESIA CARE TRANSFER NOTE
Patient: Nils Tellez    Procedure(s):  CYSTOSCOPY RIGHT RETROGRADE, RIGHT URETEROSCOPY HOLMIUM LASER LITHOTRIPSY AND RIGHT STENT PLACEMENT  - Wound Class: II-Clean Contaminated    Diagnosis: RIGHT URETERAL STONE  Diagnosis Additional Information: No value filed.    Anesthesia Type:   MAC     Note:  Airway :Face Mask  Patient transferred to:PACU  Comments: Vss. Report given to RN assuming care of pt. LMA removed upon arrival      Vitals: (Last set prior to Anesthesia Care Transfer)    CRNA VITALS  10/17/2017 0800 - 10/17/2017 0837      10/17/2017             Pulse: 54    Ht Rate: 53    SpO2: 100 %    Resp Rate (observed): 14                Electronically Signed By: MARIAA Arevalo CRNA  October 17, 2017  8:37 AM

## 2017-10-17 NOTE — IP AVS SNAPSHOT
[facility] [address]    [place] [address]    Phone:  [phone]                                       After Visit Summary   10/17/2017    Nils Tellez    MRN: [id number]           After Visit Summary Signature Page     I have received my discharge instructions, and my questions have been answered. I have discussed any challenges I see with this plan with the nurse or doctor.    ..........................................................................................................................................  Patient/Patient Representative Signature      ..........................................................................................................................................  Patient Representative Print Name and Relationship to Patient    ..................................................               ................................................  Date                                            Time    ..........................................................................................................................................  Reviewed by Signature/Title    ...................................................              ..............................................  Date                                                            Time

## 2017-10-17 NOTE — IP AVS SNAPSHOT
MRN:[id number]                      After Visit Summary   10/17/2017    Nils Tellez    MRN: [id number]           Thank you!     Thank you for choosing [facility] for your care. Our goal is always to provide you with excellent care. Hearing back from our patients is one way we can continue to improve our services. Please take a few minutes to complete the written survey that you may receive in the mail after you visit with us. Thank you!        Patient Information     Date Of Birth          1965        About your hospital stay     You were admitted on:  October 17, 2017 You last received care in the:  [facility] PACU    You were discharged on:  October 17, 2017       Who to Call     For medical emergencies, please call 911.  For non-urgent questions about your medical care, please call your primary care provider or clinic, [phone]  For questions related to your surgery, please call your surgery clinic        Attending Provider     Provider Specialty    Kobe Iniguez MD Urology       Primary Care Provider Office Phone # Fax #    Luis L Edgar [phone]      After Care Instructions     Diet Instructions       Resume pre-procedure diet            Discharge Instructions       Patient to follow up with appointment in 1-2 weeks for stent removal  Kobe Iniguez MD  [facility]  [phone]            No driving or operating machinery        until the day after procedure                  Further instructions from your care team         Same Day Surgery Discharge Instructions for  Sedation and General Anesthesia       It's not unusual to feel dizzy, light-headed or faint for up to 24 hours after surgery or while taking pain medication.  If you have these symptoms: sit for a few minutes before standing and have someone assist you when you get up to walk or use the bathroom.      You should rest and relax for the next 24 hours. We  recommend you make arrangements to have an adult stay with you for at least 24 hours after your discharge.  Avoid hazardous and strenuous activity.      DO NOT DRIVE any vehicle or operate mechanical equipment for 24 hours following the end of your surgery.  Even though you may feel normal, your reactions may be affected by the medication you have received.      Do not drink alcoholic beverages for 24 hours following surgery.       Slowly progress to your regular diet as you feel able. It's not unusual to feel nauseated and/or vomit after receiving anesthesia.  If you develop these symptoms, drink clear liquids (apple juice, ginger ale, broth, 7-up, etc. ) until you feel better.  If your nausea and vomiting persists for 24 hours, please notify your surgeon.        All narcotic pain medications, along with inactivity and anesthesia, can cause constipation. Drinking plenty of liquids and increasing fiber intake will help.      For any questions of a medical nature, call your surgeon.      Do not make important decisions for 24 hours.      If you had general anesthesia, you may have a sore throat for a couple of days related to the breathing tube used during surgery.  You may use Cepacol lozenges to help with this discomfort.  If it worsens or if you develop a fever, contact your surgeon.       If you feel your pain is not well managed with the pain medications prescribed by your surgeon, please contact your surgeon's office to let them know so they can address your concerns.             Cystoscopy, Holmium Laser with Stent Placement Discharge Instructions    Holmium laser lithotripsy was used to break up your kidney stone(s). It is normal to have visible blood in the urine, burning, urgency and frequency following this procedure. These symptoms may last for a few days to weeks.     Diet:    To help pass stone fragments and clear the blood out of the urine, it is important to drink 6-8 glasses of fluids per day at home  - at least 3-4 glasses should be water.       Return to the diet that you were on before the procedure, unless you are given specific diet instructions.    Activity:    Walk short distances and increase as your strength allows.    You may climb stairs.    Do not do strenuous exercise or heavy lifting until approved by surgeon.    Do not drive while taking narcotic pain medications.    Bathing:    You may take a shower.          Stent information    During surgery, a stent was placed in the ureter.  The ureter is the tube that drains urine from the kidney to the bladder.  The stent is placed to dilate (open) the ureter so the stone fragments can pass easily through the ureter or to decrease ureteral swelling after surgery, or to relieve an obstruction. The stent is made of rubber. The upper end of the stent curls in the kidney while the lower end rests in the bladder.    While the stent is in place you may experience the following symptoms:    Blood and/or small blood clots in urine.    Bladder spasm (frequency and urgency of urination).    Discomfort or aching in the back or side where the stent is.    Burning or discomfort at the end of urine stream.    To decrease these symptoms you should:    Take pain medication as prescribed.    Drink plenty of fluids.    If you experience pain at the end of urination try not emptying your bladder completely.    If having discomfort in back or side, decrease activity.    Call your physician if these signs/symptoms are present:    Pain that is not relieved by a short rest or ordered pain medications.    Temperature at or above 101.0 F or chills.    Inability or difficulty urinating.     Excessive blood in urine.    Any questions or concerns.            **If you have questions or concerns about your procedure,   call Dr. Iniguez at [phone]**          Pending Results     Date and Time Order Name Status Description    10/17/2017 0832 XR Surgery POLO L/T 5 Min Fluoro w Stills  "In process             Admission Information     Date & Time Provider Department Dept. Phone    10/17/2017 Kobe Iniguez MD [facility] [phone]      Your Vitals Were     Blood Pressure Temperature Respirations Height Weight Pulse Oximetry    118/70 95.5  F (35.3  C) 10 1.778 m (5' 10\") 92.4 kg (203 lb 12.8 oz) 99%    BMI (Body Mass Index)                   29.24 kg/m2           WeVue Information     WeVue lets you send messages to your doctor, view your test results, renew your prescriptions, schedule appointments and more. To sign up, go to [email or web address] . Click on \"Log in\" on the left side of the screen, which will take you to the Welcome page. Then click on \"Sign up Now\" on the right side of the page.     You will be asked to enter the access code listed below, as well as some personal information. Please follow the directions to create your username and password.     Your access code is: [email or web address]  Expires: 2017 11:05 AM     Your access code will  in 90 days. If you need help or a new code, please call your [facility] clinic or [phone].        Care EveryWhere ID     This is your Care EveryWhere ID. This could be used by other organizations to access your [facility] medical records  [id number]        Equal Access to Services     [facility] : Hadii anastasia self hadasho Sokarenali, waaxda luqadaha, qaybta kaalmada adecheyenneyada, sharon shelby . So [facility] [phone].    ATENCIÓN: Si habla español, tiene a hamilton disposición servicios gratuitos de asistencia lingüística. Ally bravo [phone].    We comply with applicable federal civil rights laws and [place] laws. We do not discriminate on the basis of race, color, national origin, age, disability, sex, sexual orientation, or gender identity.               Review of your medicines      START taking        Dose / Directions    HYDROcodone-acetaminophen 5-325 MG per tablet   Commonly known as:  " NORCO   Used for:  Postoperative pain        Dose:  1-2 tablet   Take 1-2 tablets by mouth every 6 hours as needed for other (Moderate to Severe Pain)   Quantity:  20 tablet   Refills:  0       oxybutynin 5 MG tablet   Commonly known as:  DITROPAN   Used for:  Right ureteral stone        1 TAB EVERY 6 HRS AS NEEDED FOR BLADDER SPASMS/IRRITATION FROM STENT   Quantity:  30 tablet   Refills:  0       tamsulosin 0.4 MG capsule   Commonly known as:  FLOMAX   Used for:  Right ureteral stone        DAILY AS NEEDED FOR STENT PAIN   Quantity:  14 capsule   Refills:  0         CONTINUE these medicines which have NOT CHANGED        Dose / Directions    ALTACE PO        Dose:  5 mg   Take 5 mg by mouth every morning   Refills:  0       CHOLESTOFF PO        Dose:  2 tablet   Take 2 tablets by mouth 2 times daily   Refills:  0       CINNAMON PO        Dose:  4000 mg   Take 4,000 mg by mouth 2 times daily   Refills:  0       fluticasone 50 MCG/ACT spray   Commonly known as:  FLONASE        Dose:  1-2 spray   Spray 1-2 sprays into both nostrils daily   Refills:  0       LECITHIN PO        Dose:  1200 mg   Take 1,200 mg by mouth every morning   Refills:  0       multivitamin, therapeutic with minerals Tabs tablet        Dose:  1 tablet   Take 1 tablet by mouth daily   Refills:  0       NIACIN PO        Dose:  1500 mg   Take 1,500 mg by mouth At Bedtime   Refills:  0       OSTEO BI-FLEX JOINT SHIELD PO        Dose:  1 tablet   Take 1 tablet by mouth 2 times daily   Refills:  0       triamcinolone 0.1 % cream   Commonly known as:  KENALOG        Apply topically 2 times daily   Refills:  0       VITAMIN B-12 PO        Dose:  5000 mg   Take 5,000 mg by mouth 2 times daily   Refills:  0       VITAMIN C PO        Dose:  2000 mg   Take 2,000 mg by mouth 2 times daily   Refills:  0       VITAMIN D (CHOLECALCIFEROL) PO        Dose:  2000 Units   Take 2,000 Units by mouth daily   Refills:  0       XANAX 0.5 MG tablet   Generic drug:   ALPRAZolam        Dose:  0.5 mg   Take 0.5 mg by mouth 2 times daily as needed for anxiety   Refills:  0       ZETIA PO        Dose:  10 mg   Take 10 mg by mouth At Bedtime   Refills:  0            Where to get your medicines      These medications were sent to [facility] [place], [place] - [address]  [address], [place] [address]     Phone:  [phone]     oxybutynin 5 MG tablet    tamsulosin 0.4 MG capsule         Some of these will need a paper prescription and others can be bought over the counter. Ask your nurse if you have questions.     Bring a paper prescription for each of these medications     HYDROcodone-acetaminophen 5-325 MG per tablet                Protect others around you: Learn how to safely use, store and throw away your medicines at www.disposemymeds.org.             Medication List: This is a list of all your medications and when to take them. Check marks below indicate your daily home schedule. Keep this list as a reference.      Medications           Morning Afternoon Evening Bedtime As Needed    ALTACE PO   Take 5 mg by mouth every morning                                CHOLESTOFF PO   Take 2 tablets by mouth 2 times daily                                CINNAMON PO   Take 4,000 mg by mouth 2 times daily                                fluticasone 50 MCG/ACT spray   Commonly known as:  FLONASE   Spray 1-2 sprays into both nostrils daily                                HYDROcodone-acetaminophen 5-325 MG per tablet   Commonly known as:  NORCO   Take 1-2 tablets by mouth every 6 hours as needed for other (Moderate to Severe Pain)                                LECITHIN PO   Take 1,200 mg by mouth every morning                                multivitamin, therapeutic with minerals Tabs tablet   Take 1 tablet by mouth daily                                NIACIN PO   Take 1,500 mg by mouth At Bedtime                                OSTEO BI-FLEX JOINT SHIELD PO   Take  1 tablet by mouth 2 times daily                                oxybutynin 5 MG tablet   Commonly known as:  DITROPAN   1 TAB EVERY 6 HRS AS NEEDED FOR BLADDER SPASMS/IRRITATION FROM STENT                                tamsulosin 0.4 MG capsule   Commonly known as:  FLOMAX   DAILY AS NEEDED FOR STENT PAIN                                triamcinolone 0.1 % cream   Commonly known as:  KENALOG   Apply topically 2 times daily                                VITAMIN B-12 PO   Take 5,000 mg by mouth 2 times daily                                VITAMIN C PO   Take 2,000 mg by mouth 2 times daily                                VITAMIN D (CHOLECALCIFEROL) PO   Take 2,000 Units by mouth daily                                XANAX 0.5 MG tablet   Take 0.5 mg by mouth 2 times daily as needed for anxiety   Generic drug:  ALPRAZolam                                ZETIA PO   Take 10 mg by mouth At Bedtime

## 2017-10-17 NOTE — OP NOTE
DATE OF PROCEDURE:  10/17/2017      PREOPERATIVE DIAGNOSES:   1.  Hematuria.   2.  7 mm right ureteropelvic junction stone.      POSTOPERATIVE DIAGNOSES:     1.  Hematuria.   2.  7 mm right ureteropelvic junction stone.      PROCEDURES:   1.  Diagnostic cystoscopy.   2.  Right retrograde pyelogram.   3.  Right ureteroscopy with holmium laser lithotripsy.   4.  Placement of right ureteral stent.      COMPLICATIONS:  None.      BLOOD LOSS:  None.      ANESTHESIA:  General.      INDICATIONS:  Mr. Nils Tellez is a 51-year-old gentleman who presented to my office for hematuria consultation, he had a CT urogram on 09/10/2017 that showed a 7 mm stone in the right renal pelvis near the ureteropelvic junction, but no significant hydronephrosis.  The remainder of the CT scan was normal.  I discussed with him the CT findings and recommended that we proceed with hematuria workup and treatment of the stone given the size.  We discussed options and ultimately elected to proceed with right ureteroscopy and laser lithotripsy as well as diagnostic cystoscopy to complete his hematuria evaluation.      DESCRIPTION OF PROCEDURE:  After informed consent was obtained, the patient was taken to the urology operative suite.  He was positioned supine and general anesthesia was induced.  He was converted to lithotomy and was prepped and draped in standard fashion.  A timeout was taken.  He was given preoperative IV antibiotics.  A 22-Divehi cystoscope was placed in the bladder.  There was mild lateral lobe enlargement of the prostate but not significant obstruction.  The bladder was surveyed and was normal throughout.  The trigone was normal with ureteral orifices were in normal orthotopic position.  It should be noted on the CT scan that there was a possible duplication of the right ureter, but there was only 1 ureteral orifice on the right side.  An open-ended ureteral catheter was placed in the right distal ureter and a retrograde  pyelogram was performed that indeed showed only 1 ureter and did not show any evidence of duplication.  There was mild obstruction at the ureteropelvic junction where there was a faint stone seen on fluoroscopy.  The collecting system filled normally with no filling defects or hydronephrosis.  The remainder of the ureter was normal as well.      A straight tipped Sensor wire was placed up into the right collecting system under fluoroscopy.  This was used to place a dual lumen catheter followed by a Super Stiff wire.  The Sensor wire was set aside as a safety wire.  The Super Stiff wire was used to place the flexible Storz digital ureteroscope up into the ureteropelvic junction where there was a 7 mm stone.  The 200 micron holmium laser fiber was brought in and used to start fragmenting the stone, but it refluxed up into the collecting system at the upper pole.  At this location the stone was broken up into multiple tiny stone and dust-like fragments using a setting of 0.4 joules and a rate of 40 Hz.  There were some more dense fragments that required a setting of 0.8 joules on the laser.  I surveyed the entire collecting system 2 times over and did not see any significant sized stone fragments remaining.  The scope was slowly backed down and there were no fragments in the ureter.  The Sensor wire was then fed onto a cystoscope and was used to place a 4.7 Surinamese x 24 cm stent in the standard fashion.  There was nice curl both ends and the stent was in good position.  The bladder was drained.  A B&O suppository was placed.  He was awoken and taken to recovery in stable condition.  There were no complications.      PLAN:  He will be scheduled for cystoscopy and stent removal in the office in 1-2 weeks.         MAY PENALOZA MD             D: 10/17/2017 08:50   T: 10/17/2017 09:31   MT: [id number]      Name:     SOFY REAL   MRN:      [id number]        Account:        [id number]   :      1965            Procedure Date: 10/17/2017      Document: [id number]

## 2017-10-17 NOTE — BRIEF OP NOTE
[facility] Brief Operative Note    Pre-operative diagnosis: RIGHT URETERAL STONE   Post-operative diagnosis 7 mm right UPJ stone   Procedure: Procedure(s):  CYSTOSCOPY RIGHT RETROGRADE, RIGHT URETEROSCOPY HOLMIUM LASER LITHOTRIPSY AND RIGHT STENT PLACEMENT  - Wound Class: II-Clean Contaminated   Surgeon(s): Surgeon(s) and Role:     * Kobe Iniguez MD - Primary   Estimated blood loss: * No values recorded between 10/17/2017  7:48 AM and 10/17/2017  8:30 AM *    Specimens: * No specimens in log *   Findings: 7 mm right UPJ stone

## (undated) DEVICE — SOL WATER IRRIG 3000ML BAG 2B7117

## (undated) DEVICE — PACK CYSTOSCOPY SBA15CYFSI

## (undated) DEVICE — BAG CYSTO TABLE DRAIN

## (undated) DEVICE — GUIDEWIRE URO SOLO FLEX STR 0.035"X150CM HW35FS

## (undated) DEVICE — CATH URETERAL DL 6FR FLEX-TIP 10FRX50CM G17323 AQ-022610

## (undated) DEVICE — PAD CHUX UNDERPAD 23X24" 7136

## (undated) DEVICE — GLOVE PROTEXIS W/NEU-THERA 8.0  2D73TE80

## (undated) DEVICE — WIRE GUIDE AMPLATZ SUPER STIFF 0.035"X145CM 46-524

## (undated) DEVICE — LINEN TOWEL PACK X5 5464

## (undated) DEVICE — CATH URETERAL OPEN END 6FR AXXCESS

## (undated) RX ORDER — ONDANSETRON 2 MG/ML
INJECTION INTRAMUSCULAR; INTRAVENOUS
Status: DISPENSED
Start: 2017-10-17

## (undated) RX ORDER — FENTANYL CITRATE 50 UG/ML
INJECTION, SOLUTION INTRAMUSCULAR; INTRAVENOUS
Status: DISPENSED
Start: 2017-10-17

## (undated) RX ORDER — PROPOFOL 10 MG/ML
INJECTION, EMULSION INTRAVENOUS
Status: DISPENSED
Start: 2017-10-17

## (undated) RX ORDER — LEVOFLOXACIN 5 MG/ML
INJECTION, SOLUTION INTRAVENOUS
Status: DISPENSED
Start: 2017-10-17

## (undated) RX ORDER — LIDOCAINE HYDROCHLORIDE 20 MG/ML
INJECTION, SOLUTION EPIDURAL; INFILTRATION; INTRACAUDAL; PERINEURAL
Status: DISPENSED
Start: 2017-10-17

## (undated) RX ORDER — DEXAMETHASONE SODIUM PHOSPHATE 4 MG/ML
INJECTION, SOLUTION INTRA-ARTICULAR; INTRALESIONAL; INTRAMUSCULAR; INTRAVENOUS; SOFT TISSUE
Status: DISPENSED
Start: 2017-10-17